# Patient Record
Sex: MALE | Race: WHITE | Employment: OTHER | URBAN - METROPOLITAN AREA
[De-identification: names, ages, dates, MRNs, and addresses within clinical notes are randomized per-mention and may not be internally consistent; named-entity substitution may affect disease eponyms.]

---

## 2023-05-05 ENCOUNTER — APPOINTMENT (OUTPATIENT)
Dept: CT IMAGING | Age: 73
End: 2023-05-05
Payer: MEDICARE

## 2023-05-05 ENCOUNTER — HOSPITAL ENCOUNTER (EMERGENCY)
Age: 73
Discharge: HOME | End: 2023-05-06
Attending: SPECIALIST
Payer: MEDICARE

## 2023-05-05 ENCOUNTER — APPOINTMENT (OUTPATIENT)
Dept: GENERAL RADIOLOGY | Age: 73
End: 2023-05-05
Payer: MEDICARE
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
## 2023-05-05 DIAGNOSIS — S09.90XA CLOSED HEAD INJURY, INITIAL ENCOUNTER: ICD-10-CM

## 2023-05-05 DIAGNOSIS — F10.929 ACUTE ALCOHOLIC INTOXICATION WITH COMPLICATION (HCC): ICD-10-CM

## 2023-05-05 LAB
ABSOLUTE EOS #: 0.1 K/UL (ref 0–0.4)
ABSOLUTE LYMPH #: 1.2 K/UL (ref 1–4.8)
ABSOLUTE MONO #: 0.6 K/UL (ref 0.1–1.2)
ANION GAP SERPL CALCULATED.3IONS-SCNC: 13 MMOL/L (ref 9–17)
BASOPHILS # BLD: 0 % (ref 0–2)
BASOPHILS ABSOLUTE: 0 K/UL (ref 0–0.2)
BUN SERPL-MCNC: 16 MG/DL (ref 8–23)
CALCIUM SERPL-MCNC: 9.1 MG/DL (ref 8.6–10.4)
CHLORIDE SERPL-SCNC: 106 MMOL/L (ref 98–107)
CO2 SERPL-SCNC: 23 MMOL/L (ref 20–31)
CREAT SERPL-MCNC: 1.13 MG/DL (ref 0.7–1.2)
EOSINOPHILS RELATIVE PERCENT: 2 % (ref 1–4)
ETHANOL PERCENT: 0.2 %
ETHANOL: 200 MG/DL
GFR SERPL CREATININE-BSD FRML MDRD: >60 ML/MIN/1.73M2
GLUCOSE SERPL-MCNC: 135 MG/DL (ref 70–99)
HCT VFR BLD AUTO: 35.7 % (ref 41–53)
HGB BLD-MCNC: 12.1 G/DL (ref 13.5–17.5)
LYMPHOCYTES # BLD: 22 % (ref 24–44)
MCH RBC QN AUTO: 31.3 PG (ref 26–34)
MCHC RBC AUTO-ENTMCNC: 33.8 G/DL (ref 31–37)
MCV RBC AUTO: 92.7 FL (ref 80–100)
MONOCYTES # BLD: 11 % (ref 2–11)
PDW BLD-RTO: 13.9 % (ref 12.5–15.4)
PLATELET # BLD AUTO: 208 K/UL (ref 140–450)
PMV BLD AUTO: 7.5 FL (ref 6–12)
POTASSIUM SERPL-SCNC: 3.8 MMOL/L (ref 3.7–5.3)
RBC # BLD: 3.85 M/UL (ref 4.5–5.9)
SEG NEUTROPHILS: 65 % (ref 36–66)
SEGMENTED NEUTROPHILS ABSOLUTE COUNT: 3.5 K/UL (ref 1.8–7.7)
SODIUM SERPL-SCNC: 142 MMOL/L (ref 135–144)
TROPONIN I SERPL DL<=0.01 NG/ML-MCNC: 11 NG/L (ref 0–22)
WBC # BLD AUTO: 5.4 K/UL (ref 3.5–11)

## 2023-05-05 PROCEDURE — G0480 DRUG TEST DEF 1-7 CLASSES: HCPCS

## 2023-05-05 PROCEDURE — 36415 COLL VENOUS BLD VENIPUNCTURE: CPT

## 2023-05-05 PROCEDURE — 85025 COMPLETE CBC W/AUTO DIFF WBC: CPT

## 2023-05-05 PROCEDURE — 70450 CT HEAD/BRAIN W/O DYE: CPT

## 2023-05-05 PROCEDURE — 75809 NONVASCULAR SHUNT X-RAY: CPT

## 2023-05-05 PROCEDURE — 84484 ASSAY OF TROPONIN QUANT: CPT

## 2023-05-05 PROCEDURE — 99285 EMERGENCY DEPT VISIT HI MDM: CPT

## 2023-05-05 PROCEDURE — 80048 BASIC METABOLIC PNL TOTAL CA: CPT

## 2023-05-05 PROCEDURE — 72125 CT NECK SPINE W/O DYE: CPT

## 2023-05-05 RX ORDER — ATORVASTATIN CALCIUM 40 MG/1
TABLET, FILM COATED ORAL
COMMUNITY
Start: 2020-02-07

## 2023-05-05 RX ORDER — TADALAFIL 5 MG/1
5 TABLET ORAL DAILY
COMMUNITY

## 2023-05-05 RX ORDER — ASPIRIN 81 MG/1
81 TABLET ORAL DAILY
COMMUNITY
Start: 2021-05-26

## 2023-05-05 RX ORDER — CLOPIDOGREL BISULFATE 75 MG/1
75 TABLET ORAL
COMMUNITY
Start: 2020-03-09

## 2023-05-05 RX ORDER — FERROUS SULFATE 325(65) MG
65 TABLET ORAL
COMMUNITY
Start: 2020-12-08

## 2023-05-05 RX ORDER — PANTOPRAZOLE SODIUM 20 MG/1
20 TABLET, DELAYED RELEASE ORAL DAILY
COMMUNITY

## 2023-05-05 ASSESSMENT — PAIN - FUNCTIONAL ASSESSMENT: PAIN_FUNCTIONAL_ASSESSMENT: NONE - DENIES PAIN

## 2023-05-06 VITALS
OXYGEN SATURATION: 96 % | BODY MASS INDEX: 31.34 KG/M2 | DIASTOLIC BLOOD PRESSURE: 82 MMHG | HEART RATE: 74 BPM | HEIGHT: 66 IN | WEIGHT: 195 LBS | SYSTOLIC BLOOD PRESSURE: 136 MMHG | RESPIRATION RATE: 16 BRPM | TEMPERATURE: 98.1 F

## 2023-05-06 LAB
EKG ATRIAL RATE: 67 BPM
EKG P AXIS: 50 DEGREES
EKG P-R INTERVAL: 180 MS
EKG Q-T INTERVAL: 370 MS
EKG QRS DURATION: 100 MS
EKG QTC CALCULATION (BAZETT): 390 MS
EKG R AXIS: -55 DEGREES
EKG T AXIS: 63 DEGREES
EKG VENTRICULAR RATE: 67 BPM

## 2023-05-06 PROCEDURE — 93005 ELECTROCARDIOGRAM TRACING: CPT | Performed by: NURSE PRACTITIONER

## 2023-05-06 ASSESSMENT — ENCOUNTER SYMPTOMS
VOMITING: 0
ABDOMINAL PAIN: 0
CHEST TIGHTNESS: 0
NAUSEA: 0
COUGH: 0

## 2023-05-06 NOTE — DISCHARGE INSTRUCTIONS
The patient presents with a closed head injury. The patient is neurologically intact. The presentation does not suggest a serious head injury. Signs and symptoms of a serious head injury have been discussed with the patient and caregiver, who will be vigilant for these. Concerns of repeat head injury have also been discussed. The patient has been observed adequately in the ED. Pt has been instructed to return to the ED if symptoms do not go away or worsen or change in any way. The patient understands that at this time there is no evidence for a more malignant underlying process, but the patient also understands that early in the process of an illness or injury, an emergency department workup can be falsely reassuring. Routine discharge counseling was given, and the patient understands that worsening, changing or persistent symptoms should prompt an immediate call or follow up with their primary physician or return to the emergency department. The importance of appropriate follow up was also discussed. I have reviewed the disposition diagnosis with the patient and or their family/guardian. I have answered their questions and given discharge instructions. They voiced understanding of these instructions and did not have any further questions or complaints.

## 2023-05-06 NOTE — ED NOTES
Pt ambulated into hallway with minimal assistance, wife comfortable with discharging pt home     [facility]  05/06/23 4620

## 2023-05-06 NOTE — ED PROVIDER NOTES
[facility]  Emergency Department  Faculty Attestation     Pt Name: Rubina Hayes  MRN: [id number]  [place] 1950  Date of evaluation: 5/6/23    I was personally available for consultation in the Emergency Department. Have reviewed everything on the chart that is available and agree with the documentation provided by the [facility], including discussion about the assessment, treatment plan and disposition. Rubina Hayes is a 67 y.o. male who presents with Fall [place] off chair after drinking 2-3 margaritas; believe he hit head and eyes rolled back; pt has a shunt)      Vitals:   Vitals:    05/05/23 2205 05/05/23 2315 05/06/23 0115   BP: (!) 146/71 134/75 136/82   Pulse: 62  74   Resp: 18  16   Temp: 98.1 °F (36.7 °C)     TempSrc: Temporal     SpO2: 95% 95% 96%   Weight: 88.5 kg (195 lb)     Height: 5' 6\" (1.676 m)         EKG Interpretation    Interpreted by emergency department physician    Rhythm: normal sinus   Rate: normal  Axis: normal  Ectopy: none  Conduction: left anterior fasciclar block  ST Segments: no acute change  T Waves: no acute change  Q Waves: none    Clinical Impression: non-specific EKG    Ruma Doshi MD     Impression:   1. Fall, initial encounter    2.  Acute alcoholic intoxication with complication (HCC)    3. Closed head injury, initial encounter           Ruma Doshi MD  05/06/23 8455

## 2023-05-06 NOTE — ED PROVIDER NOTES
[address]      Pt Name: Brando Harris  MRN: [id number]  [place] 1950  Date of evaluation: 5/5/2023  Provider: JATIN Reece CNP    CHIEF COMPLAINT       Chief Complaint   Patient presents with    [place] off chair after drinking 2-3 margaritas; believe he hit head and eyes rolled back; pt has a shunt         HISTORY OF PRESENT ILLNESS   (Location/Symptom, Timing/Onset, Context/Setting, Quality, Duration, Modifying Factors, Severity)  Note limiting factors. Brando Harris is a 67 y.o. male who presents to the emergency department for evaluation of a fall around 9 PM.  History is provided by the wife as the patient is amnesic to the event. The wife states that they were at a party at the neighbor's house for [facility] and they were having drinks. He was in another room than her and she is unsure how many drinks that he had although he states it was only 2-3. She is unsure whether or not he ate anything either. She noticed him get up from the chair and then all of a sudden fall over. She states that she did not see whether or not he hit his head but one of his buddies says that he did hit his head. Patient has a history of a shunt and normal pressure hydrocephalus. After the fall the patient has been acting more confused. He keeps repeating questions and asking what happened. He denies any chest pain no shortness of breath no nausea no vomiting. He denies headache. He does remember going to the neighbor's house for the party around 6:00 and then remembers events shortly after the incident but does not remember anything prior to the fall. Nursing Notes were reviewed. REVIEW OF SYSTEMS    (2-9 systems for level 4, 10 or more for level 5)     Review of Systems   Respiratory:  Negative for cough and chest tightness. Gastrointestinal:  Negative for abdominal pain, nausea and vomiting.    Neurological: